# Patient Record
Sex: FEMALE | Race: WHITE
[De-identification: names, ages, dates, MRNs, and addresses within clinical notes are randomized per-mention and may not be internally consistent; named-entity substitution may affect disease eponyms.]

---

## 2022-06-24 ENCOUNTER — HOSPITAL ENCOUNTER (INPATIENT)
Dept: HOSPITAL 95 - PCU | Age: 62
LOS: 3 days | Discharge: HOME | DRG: 246 | End: 2022-06-27
Attending: HOSPITALIST | Admitting: HOSPITALIST
Payer: COMMERCIAL

## 2022-06-24 VITALS — HEIGHT: 61 IN | BODY MASS INDEX: 55.32 KG/M2 | WEIGHT: 293 LBS

## 2022-06-24 DIAGNOSIS — J96.01: ICD-10-CM

## 2022-06-24 DIAGNOSIS — E66.01: ICD-10-CM

## 2022-06-24 DIAGNOSIS — J96.02: ICD-10-CM

## 2022-06-24 DIAGNOSIS — I11.0: Primary | ICD-10-CM

## 2022-06-24 DIAGNOSIS — Z98.51: ICD-10-CM

## 2022-06-24 DIAGNOSIS — F32.A: ICD-10-CM

## 2022-06-24 DIAGNOSIS — Z79.811: ICD-10-CM

## 2022-06-24 DIAGNOSIS — Z79.52: ICD-10-CM

## 2022-06-24 DIAGNOSIS — Z95.1: ICD-10-CM

## 2022-06-24 DIAGNOSIS — Z79.4: ICD-10-CM

## 2022-06-24 DIAGNOSIS — G47.33: ICD-10-CM

## 2022-06-24 DIAGNOSIS — F41.9: ICD-10-CM

## 2022-06-24 DIAGNOSIS — I21.A1: ICD-10-CM

## 2022-06-24 DIAGNOSIS — Z79.82: ICD-10-CM

## 2022-06-24 DIAGNOSIS — E11.9: ICD-10-CM

## 2022-06-24 DIAGNOSIS — I10: ICD-10-CM

## 2022-06-24 DIAGNOSIS — J44.1: ICD-10-CM

## 2022-06-24 DIAGNOSIS — J44.0: ICD-10-CM

## 2022-06-24 DIAGNOSIS — U09.9: ICD-10-CM

## 2022-06-24 DIAGNOSIS — K21.9: ICD-10-CM

## 2022-06-24 DIAGNOSIS — Z91.011: ICD-10-CM

## 2022-06-24 DIAGNOSIS — I50.30: ICD-10-CM

## 2022-06-24 DIAGNOSIS — Z79.51: ICD-10-CM

## 2022-06-24 DIAGNOSIS — Z95.5: ICD-10-CM

## 2022-06-24 DIAGNOSIS — Z98.890: ICD-10-CM

## 2022-06-24 DIAGNOSIS — Z79.02: ICD-10-CM

## 2022-06-24 DIAGNOSIS — J20.9: ICD-10-CM

## 2022-06-24 DIAGNOSIS — R77.8: ICD-10-CM

## 2022-06-24 DIAGNOSIS — F17.210: ICD-10-CM

## 2022-06-24 DIAGNOSIS — E78.5: ICD-10-CM

## 2022-06-24 DIAGNOSIS — Z91.018: ICD-10-CM

## 2022-06-24 DIAGNOSIS — I25.10: ICD-10-CM

## 2022-06-24 DIAGNOSIS — Z79.84: ICD-10-CM

## 2022-06-24 DIAGNOSIS — Z71.6: ICD-10-CM

## 2022-06-24 DIAGNOSIS — R65.10: ICD-10-CM

## 2022-06-24 LAB
HCT VFR BLD AUTO: 44.9 % (ref 33–51)
HGB BLD-MCNC: 14.2 G/DL (ref 11.5–16)
PH BLDV: 7.42 [PH] (ref 7.34–7.37)
PLATELET # BLD AUTO: 230 K/MM3 (ref 150–400)
PROTHROMBIN TIME: 11.6 SEC (ref 9.7–11.5)
UFH PPP CHRO-ACNC: <0.1 IU/ML

## 2022-06-24 PROCEDURE — C1874 STENT, COATED/COV W/DEL SYS: HCPCS

## 2022-06-24 PROCEDURE — C1725 CATH, TRANSLUMIN NON-LASER: HCPCS

## 2022-06-24 PROCEDURE — A9270 NON-COVERED ITEM OR SERVICE: HCPCS

## 2022-06-24 PROCEDURE — C1894 INTRO/SHEATH, NON-LASER: HCPCS

## 2022-06-24 PROCEDURE — C9604 PERC D-E COR REVASC T CABG S: HCPCS

## 2022-06-24 PROCEDURE — C8929 TTE W OR WO FOL WCON,DOPPLER: HCPCS

## 2022-06-24 PROCEDURE — C1769 GUIDE WIRE: HCPCS

## 2022-06-24 PROCEDURE — C1760 CLOSURE DEV, VASC: HCPCS

## 2022-06-24 PROCEDURE — C1887 CATHETER, GUIDING: HCPCS

## 2022-06-24 PROCEDURE — 5A09357 ASSISTANCE WITH RESPIRATORY VENTILATION, LESS THAN 24 CONSECUTIVE HOURS, CONTINUOUS POSITIVE AIRWAY PRESSURE: ICD-10-PCS | Performed by: HOSPITALIST

## 2022-06-24 NOTE — NUR
END OF SHIFT SUMMARY:
    PATIENT IS INFUSING LR  AND HEPARIN GTT AT 15. PATIENT WITH
BILATERALY 20 AC IV'S. PATIENT IN NO SIGN OF ACUTE DISTRESS OCCASSIONALLY WILL
TACH UP INTO 'S BUT MAINLY IS IN THE 'S DENIES CHEST PAIN OR
PRESSURE AT THIS TIME, HER TROPONINS WERE ELEVATED BUT ASYMPTOMATIC. IS SOB AT
TIMES AT REST IMPROVING. LAST COVID PCR AT [facility] TODAY WAS
NEGATIVE. ARRIVED FROM [facility] AROUND 1545 TODAY. PATIENT AS ONLY
IMPROVED SINCE ARRIVAL. PATIENT RECIEVED AZITHROMYCIN IV AND ROCEPHIN SINCE
BEING AT THE HOSPITAL. PATIENT NPO AT MIDNIGHT OK'D BY ADMITTING PROVIDER AND
CARDIOLOGY. AS CARDIOLOGY WAS CONSULTED CXR SHOWED CARDIOMEGALY AND LIKELY
TRACE PLEURAL EFFUSION. ECHO TBD, WILL CONTINUE TO MONITOR UNTIL SHIFT CHANGE.

## 2022-06-25 LAB
ALBUMIN SERPL BCP-MCNC: 3.1 G/DL (ref 3.4–5)
ALBUMIN/GLOB SERPL: 0.9 {RATIO} (ref 0.8–1.8)
ALT SERPL W P-5'-P-CCNC: 47 U/L (ref 12–78)
ANION GAP SERPL CALCULATED.4IONS-SCNC: 6 MMOL/L (ref 6–16)
AST SERPL W P-5'-P-CCNC: 66 U/L (ref 12–37)
BASOPHILS # BLD AUTO: 0.01 K/MM3 (ref 0–0.23)
BASOPHILS NFR BLD AUTO: 0 % (ref 0–2)
BILIRUB SERPL-MCNC: 0.4 MG/DL (ref 0.1–1)
BUN SERPL-MCNC: 17 MG/DL (ref 8–24)
CALCIUM SERPL-MCNC: 9.8 MG/DL (ref 8.5–10.1)
CHLORIDE SERPL-SCNC: 107 MMOL/L (ref 98–108)
CHOLEST SERPL-MCNC: 145 MG/DL (ref 50–200)
CHOLEST/HDLC SERPL: 2.4 {RATIO}
CO2 SERPL-SCNC: 27 MMOL/L (ref 21–32)
CREAT SERPL-MCNC: 0.59 MG/DL (ref 0.4–1)
DEPRECATED RDW RBC AUTO: 45.1 FL (ref 35.1–46.3)
EOSINOPHIL # BLD AUTO: 0 K/MM3 (ref 0–0.68)
EOSINOPHIL NFR BLD AUTO: 0 % (ref 0–6)
ERYTHROCYTE [DISTWIDTH] IN BLOOD BY AUTOMATED COUNT: 13.8 % (ref 11.7–14.2)
GLOBULIN SER CALC-MCNC: 3.5 G/DL (ref 2.2–4)
GLUCOSE SERPL-MCNC: 359 MG/DL (ref 70–99)
HCT VFR BLD AUTO: 40.4 % (ref 33–51)
HDLC SERPL-MCNC: 60 MG/DL (ref 39–?)
HGB BLD-MCNC: 13.1 G/DL (ref 11.5–16)
IMM GRANULOCYTES # BLD AUTO: 0.09 K/MM3 (ref 0–0.1)
IMM GRANULOCYTES NFR BLD AUTO: 1 % (ref 0–1)
LDLC SERPL CALC-MCNC: 61 MG/DL (ref 0–110)
LDLC/HDLC SERPL: 1 {RATIO}
LYMPHOCYTES # BLD AUTO: 1.05 K/MM3 (ref 0.84–5.2)
LYMPHOCYTES NFR BLD AUTO: 8 % (ref 21–46)
MAGNESIUM SERPL-MCNC: 2.2 MG/DL (ref 1.6–2.4)
MCHC RBC AUTO-ENTMCNC: 32.4 G/DL (ref 31.5–36.5)
MCV RBC AUTO: 90 FL (ref 80–100)
MONOCYTES # BLD AUTO: 0.26 K/MM3 (ref 0.16–1.47)
MONOCYTES NFR BLD AUTO: 2 % (ref 4–13)
NEUTROPHILS # BLD AUTO: 11.75 K/MM3 (ref 1.96–9.15)
NEUTROPHILS NFR BLD AUTO: 89 % (ref 41–73)
NRBC # BLD AUTO: 0 K/MM3 (ref 0–0.02)
NRBC BLD AUTO-RTO: 0 /100 WBC (ref 0–0.2)
PLATELET # BLD AUTO: 219 K/MM3 (ref 150–400)
POTASSIUM SERPL-SCNC: 4.2 MMOL/L (ref 3.5–5.5)
PROT SERPL-MCNC: 6.6 G/DL (ref 6.4–8.2)
SODIUM SERPL-SCNC: 140 MMOL/L (ref 136–145)
TRIGL SERPL-MCNC: 120 MG/DL (ref 30–160)
VLDLC SERPL CALC-MCNC: 24 MG/DL (ref 6–32)

## 2022-06-25 PROCEDURE — B2171ZZ FLUOROSCOPY OF RIGHT INTERNAL MAMMARY BYPASS GRAFT USING LOW OSMOLAR CONTRAST: ICD-10-PCS | Performed by: INTERNAL MEDICINE

## 2022-06-25 PROCEDURE — 4A023N7 MEASUREMENT OF CARDIAC SAMPLING AND PRESSURE, LEFT HEART, PERCUTANEOUS APPROACH: ICD-10-PCS | Performed by: INTERNAL MEDICINE

## 2022-06-25 PROCEDURE — 027034Z DILATION OF CORONARY ARTERY, ONE ARTERY WITH DRUG-ELUTING INTRALUMINAL DEVICE, PERCUTANEOUS APPROACH: ICD-10-PCS | Performed by: INTERNAL MEDICINE

## 2022-06-25 PROCEDURE — B2111ZZ FLUOROSCOPY OF MULTIPLE CORONARY ARTERIES USING LOW OSMOLAR CONTRAST: ICD-10-PCS | Performed by: INTERNAL MEDICINE

## 2022-06-25 PROCEDURE — B2181ZZ FLUOROSCOPY OF LEFT INTERNAL MAMMARY BYPASS GRAFT USING LOW OSMOLAR CONTRAST: ICD-10-PCS | Performed by: INTERNAL MEDICINE

## 2022-06-25 PROCEDURE — B51V1ZZ FLUOROSCOPY OF OTHER VEINS USING LOW OSMOLAR CONTRAST: ICD-10-PCS | Performed by: INTERNAL MEDICINE

## 2022-06-25 PROCEDURE — B2151ZZ FLUOROSCOPY OF LEFT HEART USING LOW OSMOLAR CONTRAST: ICD-10-PCS | Performed by: INTERNAL MEDICINE

## 2022-06-25 NOTE — NUR
END OF SHIFT SUMMARY:
     PATIENT HAS BEEN RESTING MOST OF THE DAY, WENT TO ANGIO THIS MORNING WITH
A STENT PLACED TO THE SVG TO DIAG. WITH MYNX CLOSURE. R GROIN SITE RECOVERED
VERY WELL WITH NO BLEEDING, PATIENT HAS BEEN FEELING INCREASINGLY BETTER, NO
LONGER ST UNLESS WITH ACTIVITY. PATIENT DENIES CHEST PAIN PRESSURE, DECREASED
WORK OF BREATHING. PATIENT ONLY TOLERATED CPAP IN SMALL PERIODS OF TIME.
DISTAL PULSES AND ALL RECOVERY VITALS STABLE EKG PREFORMED DR. REYES HAS
NOT REVIEWED AT THIS TIME, UNLESS VIA [employer]. PATIENT HAS BEEN ABLE TO EAT,
CBG'S HAVE BEEN INCREASED. NO NEW ORDERS AFTER VERBALLY SPEAKING WITH
HOSPITALIST IN REGARDS TO POTENTIALPULM EDEMA, WILL CONTINUE TO WATCH FOR
ORDERS..  NO CONCERNS FROM THIS WRITER AT THIS TIME WILL CONTINUE TO MONITOR
UNTIL SHIFT CHANGE.

## 2022-06-25 NOTE — NUR
PT. IS ON 3L NC AND REFUSED HER CPAP MACHINE OVERNIGHT. PT. WORE THE MACHINE
FOR A TOTAL OF 2 HRS OVERNIGHT BEFORE REFUSING TO KEEP IT ON. PT. IS  OF
LR AND 15 OF HEPARIN. ANTI-XA CAME BACK WNL SO DOSAGE WAS NOT CHANGED AS OF
LAST DRAW. RAMOS IN PLACE AND DRAINING WELL, PT.  UOP OVERNIGHT. PT.
HAS PRODUCTIVE SOUNDING COUGH BUT WAS NOT ABLE TO COUGH IT OUT FOR A SPUTUM
SAMPLE.

## 2022-06-26 LAB
ALBUMIN SERPL BCP-MCNC: 3.1 G/DL (ref 3.4–5)
ANION GAP SERPL CALCULATED.4IONS-SCNC: 7 MMOL/L (ref 6–16)
BUN SERPL-MCNC: 23 MG/DL (ref 8–24)
CALCIUM SERPL-MCNC: 9.5 MG/DL (ref 8.5–10.1)
CHLORIDE SERPL-SCNC: 106 MMOL/L (ref 98–108)
CO2 SERPL-SCNC: 27 MMOL/L (ref 21–32)
CREAT SERPL-MCNC: 0.46 MG/DL (ref 0.4–1)
DEPRECATED RDW RBC AUTO: 45.6 FL (ref 35.1–46.3)
ERYTHROCYTE [DISTWIDTH] IN BLOOD BY AUTOMATED COUNT: 14 % (ref 11.7–14.2)
GLUCOSE SERPL-MCNC: 341 MG/DL (ref 70–99)
HCT VFR BLD AUTO: 38.8 % (ref 33–51)
HGB BLD-MCNC: 12.6 G/DL (ref 11.5–16)
MAGNESIUM SERPL-MCNC: 2.3 MG/DL (ref 1.6–2.4)
MCHC RBC AUTO-ENTMCNC: 32.5 G/DL (ref 31.5–36.5)
MCV RBC AUTO: 89 FL (ref 80–100)
NRBC # BLD AUTO: 0 K/MM3 (ref 0–0.02)
NRBC BLD AUTO-RTO: 0 /100 WBC (ref 0–0.2)
PHOSPHATE SERPL-MCNC: 2.8 MG/DL (ref 2.5–4.9)
PLATELET # BLD AUTO: 199 K/MM3 (ref 150–400)
POTASSIUM SERPL-SCNC: 4.5 MMOL/L (ref 3.5–5.5)
SODIUM SERPL-SCNC: 140 MMOL/L (ref 136–145)

## 2022-06-26 NOTE — NUR
END OF SHIFT:
    PATIENT BEEN PLEASANT ALERT AND ORIENTED. PATIENT ABLE TO MAKE NEEDS KNOW.
RESTARTED HOME MED VIIBRYD WHICH HAS HELPED HER ANXIETY. PATIENT HAD A BED
BATH, WORKED WITH PT, WAS CHANGED TO [place] RECIEVED NEW CARDIO MEDICATIONS
DECREASED EDEMA, HAS BEEN MOVING AROUND FROM BED TO CHAIR WITH EASE, RAMOS
CATHETER IN PLACE, DRAINING TO GRAVITY HAS SOME VERY MINOR BLOOD TINGE URINE
AT TIMES MAINLY DARKER YELLOW. PROVIDER AWARE. PATIENT TO HAVE BANDAGE ON
RIGHT GROIN REMOVED TOMORROW. SHE RECIEVED A BED BATH WITH PATIENT CARE TECH.
PATIENT IN NO SIGNS OF ACUTE DISTRESS. CBG'S STILL ELEVATED AND NEW ORDERS
HAVE BEEN PLACED BY PROVIDER. WILL CONTINUE TO MONITOR UNTIL SHIFT CHANGE.

## 2022-06-26 NOTE — NUR
SHIFT SUMMARY
PT IS ALERT AND ORIENTED. VITAL SIGNS ARE STABLE AND IS ON 2L NC WITH SATS
ABOVE 92%. PT DENIED CHEST PAIN THIS MORNING BUT REPORTS HAVING PAIN WHEN
HANDING OFF REPORT. RAMOS IN PLACE AND DRAINING TO GRAVITY. CALL LIGHT
WITHIN REACH.

## 2022-06-26 NOTE — NUR
CARE ASSUMPTION: PATIENT IN CHAIR AND MOVED TO BED FOR EXAMINATION OF ANGIO
SITE. GROIN ANGIO SITE WNL WITH PLANS TO REMOE TEGADERM IN THE MORNING.
PATIENT A&O, PLEASANT AND APPROPRIATE, AND MAKES NEEDS KNOWN.

## 2022-06-27 LAB
ANION GAP SERPL CALCULATED.4IONS-SCNC: 7 MMOL/L (ref 6–16)
BUN SERPL-MCNC: 26 MG/DL (ref 8–24)
CALCIUM SERPL-MCNC: 9.8 MG/DL (ref 8.5–10.1)
CHLORIDE SERPL-SCNC: 104 MMOL/L (ref 98–108)
CO2 SERPL-SCNC: 26 MMOL/L (ref 21–32)
CREAT SERPL-MCNC: 0.42 MG/DL (ref 0.4–1)
DEPRECATED RDW RBC AUTO: 44.9 FL (ref 35.1–46.3)
ERYTHROCYTE [DISTWIDTH] IN BLOOD BY AUTOMATED COUNT: 14 % (ref 11.7–14.2)
GLUCOSE SERPL-MCNC: 331 MG/DL (ref 70–99)
HCT VFR BLD AUTO: 41 % (ref 33–51)
HGB BLD-MCNC: 13.2 G/DL (ref 11.5–16)
MCHC RBC AUTO-ENTMCNC: 32.2 G/DL (ref 31.5–36.5)
MCV RBC AUTO: 89 FL (ref 80–100)
NRBC # BLD AUTO: 0 K/MM3 (ref 0–0.02)
NRBC BLD AUTO-RTO: 0 /100 WBC (ref 0–0.2)
PLATELET # BLD AUTO: 198 K/MM3 (ref 150–400)
POTASSIUM SERPL-SCNC: 4.5 MMOL/L (ref 3.5–5.5)
SODIUM SERPL-SCNC: 137 MMOL/L (ref 136–145)

## 2022-06-27 NOTE — NUR
Pt immediately tells me about the suicidal death of her dtr, the more recent
death of her son due to medical issues, her relationship complications, her
medical complications and her financial struggles due to ID theft that led to
her bank accounts being emptied. She is tearful at times as she tells her
story. We talk about all that she has overcome, the anchor she has because of
her Confucianism faithand the love that she still because of her dtr living in
[facility]. I normalize her frustrations and fears, listen empathically,
and provide greif support, pastoral  and prayer. Pt responds well and
shows signs of increased peace and hope.

## 2022-06-27 NOTE — NUR
SHIFT SUMMARY: PATIENT VS WNL, MAINTAINED O2 SAT >92% ON 2L NC OR BIPAP. SLEPT
WITH BIPAP IN PLACE AND REQUESTED SANDWICH ~0400 WHEN SHE AWOKE. RAMOS
DRAINING TO GRAVITY. MEDICATED PER EMAR. NO ADVERSE EVENTS THIS SHIFT. BED LOW
WITH CALL LIGHT IN PLACE.

## 2022-06-27 NOTE — NUR
discharge summary:
    PATIENT WAS WHEELED OUTBY PCT WITH NO SIGNS OF ACUTE DISTRESS, ALL
MEDICATION FORM HOME AND BELONGINGS WITH PATIENT, PATIENT DENIES CHEST PAIN,
STENT CARD WITH PATIENT, PATIENT UNDERSTOOD DISCHARGE INSTRUCTIONS COMPLETELY
WITH NO FURTHER QUESTIONS COMMENTS OR CONCERNS, PATIENT HAD RAMOS REMOVED, WAS
IN HOME CLOTHES, IV REMOVED. AND WAS AWAITING WITH PCT OUTSIDE FOR TRANSPORT
HOME. NO FURTHER QUESTIONS COMMENTS OR CONCERNS.